# Patient Record
Sex: MALE | Race: WHITE | Employment: FULL TIME | URBAN - METROPOLITAN AREA
[De-identification: names, ages, dates, MRNs, and addresses within clinical notes are randomized per-mention and may not be internally consistent; named-entity substitution may affect disease eponyms.]

---

## 2018-05-29 ENCOUNTER — OFFICE VISIT (OUTPATIENT)
Dept: FAMILY MEDICINE CLINIC | Facility: CLINIC | Age: 43
End: 2018-05-29

## 2018-05-29 VITALS
BODY MASS INDEX: 23.37 KG/M2 | HEIGHT: 72.75 IN | TEMPERATURE: 98 F | RESPIRATION RATE: 14 BRPM | HEART RATE: 84 BPM | DIASTOLIC BLOOD PRESSURE: 108 MMHG | WEIGHT: 176.38 LBS | SYSTOLIC BLOOD PRESSURE: 152 MMHG

## 2018-05-29 DIAGNOSIS — K92.1 BLOOD IN STOOL: ICD-10-CM

## 2018-05-29 DIAGNOSIS — F17.200 TOBACCO DEPENDENCE: ICD-10-CM

## 2018-05-29 DIAGNOSIS — Z00.00 ANNUAL PHYSICAL EXAM: Primary | ICD-10-CM

## 2018-05-29 DIAGNOSIS — F10.20 UNCOMPLICATED ALCOHOL DEPENDENCE (HCC): ICD-10-CM

## 2018-05-29 DIAGNOSIS — R19.8 ABNORMAL BOWEL MOVEMENT: ICD-10-CM

## 2018-05-29 DIAGNOSIS — R03.0 ELEVATED BP WITHOUT DIAGNOSIS OF HYPERTENSION: ICD-10-CM

## 2018-05-29 PROCEDURE — 99201 OFFICE/OUTPT VISIT,NEW,LEVL I: CPT | Performed by: FAMILY MEDICINE

## 2018-05-29 PROCEDURE — 99212 OFFICE O/P EST SF 10 MIN: CPT | Performed by: FAMILY MEDICINE

## 2018-05-29 PROCEDURE — 90471 IMMUNIZATION ADMIN: CPT | Performed by: FAMILY MEDICINE

## 2018-05-29 PROCEDURE — 90715 TDAP VACCINE 7 YRS/> IM: CPT | Performed by: FAMILY MEDICINE

## 2018-05-29 PROCEDURE — 99386 PREV VISIT NEW AGE 40-64: CPT | Performed by: FAMILY MEDICINE

## 2018-05-29 NOTE — PROGRESS NOTES
HPI:    Silke Draper is a 44year old male presents to clinic for an annual physical exam.   Has not seen a doctor since he was 17years old. Notes that he has hemorrhoids, thinks they have improved over the years.  Now he has developed some anal leaka HENT:   Head: Normocephalic and atraumatic.    Right Ear: Tympanic membrane, external ear and ear canal normal.   Left Ear: Tympanic membrane, external ear and ear canal normal.   Nose: Nose normal.   Mouth/Throat: Uvula is midline, oropharynx is clear an nights. Patient is willing to try this. Inpatient programs discussed, patient not agreeable to this right now. Patient verbalized understanding of information discussed. No barriers to learning observed.              Orders This Visit:    Orders Placed

## 2018-06-15 ENCOUNTER — LAB ENCOUNTER (OUTPATIENT)
Dept: LAB | Age: 43
End: 2018-06-15
Attending: FAMILY MEDICINE
Payer: COMMERCIAL

## 2018-06-15 DIAGNOSIS — K92.1 BLOOD IN STOOL: ICD-10-CM

## 2018-06-15 DIAGNOSIS — Z00.00 ANNUAL PHYSICAL EXAM: ICD-10-CM

## 2018-06-15 PROCEDURE — 85025 COMPLETE CBC W/AUTO DIFF WBC: CPT

## 2018-06-15 PROCEDURE — 83036 HEMOGLOBIN GLYCOSYLATED A1C: CPT

## 2018-06-15 PROCEDURE — 84443 ASSAY THYROID STIM HORMONE: CPT

## 2018-06-15 PROCEDURE — 36415 COLL VENOUS BLD VENIPUNCTURE: CPT

## 2018-06-15 PROCEDURE — 80061 LIPID PANEL: CPT

## 2018-06-15 PROCEDURE — 80053 COMPREHEN METABOLIC PANEL: CPT

## 2018-06-22 ENCOUNTER — TELEPHONE (OUTPATIENT)
Dept: FAMILY MEDICINE CLINIC | Facility: CLINIC | Age: 43
End: 2018-06-22

## 2018-06-22 NOTE — TELEPHONE ENCOUNTER
Called patient to discuss results, he did not answer so a VM was left asking him to call back. When he does, please inform him of normal Cmp, TSH, and A1C. Also, lipids are mildly elevated, low fat diet and exercise advised, will recheck in 6 months.  Caterina

## 2018-06-28 ENCOUNTER — TELEPHONE (OUTPATIENT)
Dept: GASTROENTEROLOGY | Facility: CLINIC | Age: 43
End: 2018-06-28

## 2018-06-28 DIAGNOSIS — R19.4 CHANGE IN BOWEL HABITS: ICD-10-CM

## 2018-06-28 DIAGNOSIS — K62.5 RECTAL BLEEDING: Primary | ICD-10-CM

## 2018-06-28 NOTE — TELEPHONE ENCOUNTER
Scheduled for:  Colonoscopy 72479  Provider Name: Dr. Malissa Mccoy  Date:  8/15/18  Location:  [facility]  Sedation:  MAC  Time:  10:30 am, arrival 9:30 am  Prep: Suprep  Meds/Allergies Reconciled?:  Physician reviewed  Diagnosis with codes:  Rectal bleeding K62.5, change

## 2018-06-28 NOTE — PATIENT INSTRUCTIONS
1. Cut back on alcohol as we discussed  2. See Dr. Alyse Purvis to discuss alcohol use  3. Schedule colonoscopy with MAC (rectal bleeding, change in bowels)  4.  Abdominal ultrasound

## 2018-06-28 NOTE — H&P
[facility], [facility] - Gastroenterology                                                                                                               Reason for consult: r week     Comment: DAILY       Medications (Active prior to today's visit):    Current Outpatient Prescriptions:  Na Sulfate-K Sulfate-Mg Sulf (SUPREP BOWEL PREP KIT) 17.5-3.13-1.6 GM/180ML Oral Solution Take as directed Disp: 1 Bottle Rfl: 0       Allergie and then stop entirely. I also asked him to f/u with Dr. Hamilton Ready to discuss options to see an addiction specialist/psychiatry. for anxiety which he has. Will also obtain ab US. LFTs wnl.     #Rectal bleeding/change in bowels- sx could be related to IBS-D a

## 2018-06-29 PROBLEM — K62.5 RECTAL BLEEDING: Status: ACTIVE | Noted: 2018-06-29

## 2018-06-29 PROBLEM — F10.10 ALCOHOL ABUSE: Status: ACTIVE | Noted: 2018-06-29

## 2018-07-20 ENCOUNTER — TELEPHONE (OUTPATIENT)
Dept: FAMILY MEDICINE CLINIC | Facility: CLINIC | Age: 43
End: 2018-07-20

## 2018-07-20 NOTE — TELEPHONE ENCOUNTER
Pt is calling requesting to speak with a Rn state that he would like to discuss his lab result he had done in June

## 2018-07-21 NOTE — TELEPHONE ENCOUNTER
LMTCB. Please transfer to triage. From 06/22/18 telephone encounter-normal Cmp, TSH, and A1C. Also, lipids are mildly elevated, low fat diet and exercise advised, will recheck in 6 months.  Patient's WBC count is elevated

## 2018-08-15 ENCOUNTER — ANESTHESIA (OUTPATIENT)
Dept: ENDOSCOPY | Facility: HOSPITAL | Age: 43
End: 2018-08-15
Payer: COMMERCIAL

## 2018-08-15 ENCOUNTER — HOSPITAL ENCOUNTER (OUTPATIENT)
Facility: HOSPITAL | Age: 43
Setting detail: HOSPITAL OUTPATIENT SURGERY
Discharge: HOME | End: 2018-08-15
Attending: INTERNAL MEDICINE | Admitting: INTERNAL MEDICINE
Payer: COMMERCIAL

## 2018-08-15 ENCOUNTER — SURGERY (OUTPATIENT)
Age: 43
End: 2018-08-15

## 2018-08-15 ENCOUNTER — ANESTHESIA EVENT (OUTPATIENT)
Dept: ENDOSCOPY | Facility: HOSPITAL | Age: 43
End: 2018-08-15
Payer: COMMERCIAL

## 2018-08-15 VITALS
DIASTOLIC BLOOD PRESSURE: 88 MMHG | SYSTOLIC BLOOD PRESSURE: 135 MMHG | RESPIRATION RATE: 29 BRPM | HEIGHT: 74 IN | BODY MASS INDEX: 24.38 KG/M2 | HEART RATE: 69 BPM | WEIGHT: 190 LBS | TEMPERATURE: 98 F | OXYGEN SATURATION: 97 %

## 2018-08-15 DIAGNOSIS — K62.5 RECTAL BLEEDING: ICD-10-CM

## 2018-08-15 DIAGNOSIS — R19.4 CHANGE IN BOWEL HABITS: ICD-10-CM

## 2018-08-15 PROCEDURE — 45330 DIAGNOSTIC SIGMOIDOSCOPY: CPT | Performed by: INTERNAL MEDICINE

## 2018-08-15 PROCEDURE — 0DJD8ZZ INSPECTION OF LOWER INTESTINAL TRACT, VIA NATURAL OR ARTIFICIAL OPENING ENDOSCOPIC: ICD-10-PCS | Performed by: INTERNAL MEDICINE

## 2018-08-15 RX ORDER — SODIUM CHLORIDE, SODIUM LACTATE, POTASSIUM CHLORIDE, CALCIUM CHLORIDE 600; 310; 30; 20 MG/100ML; MG/100ML; MG/100ML; MG/100ML
INJECTION, SOLUTION INTRAVENOUS CONTINUOUS
Status: DISCONTINUED | OUTPATIENT
Start: 2018-08-15 | End: 2018-08-15

## 2018-08-15 RX ORDER — NALOXONE HYDROCHLORIDE 0.4 MG/ML
80 INJECTION, SOLUTION INTRAMUSCULAR; INTRAVENOUS; SUBCUTANEOUS AS NEEDED
Status: DISCONTINUED | OUTPATIENT
Start: 2018-08-15 | End: 2018-08-15

## 2018-08-15 RX ORDER — LIDOCAINE HYDROCHLORIDE 10 MG/ML
INJECTION, SOLUTION EPIDURAL; INFILTRATION; INTRACAUDAL; PERINEURAL AS NEEDED
Status: DISCONTINUED | OUTPATIENT
Start: 2018-08-15 | End: 2018-08-15 | Stop reason: SURG

## 2018-08-15 RX ADMIN — SODIUM CHLORIDE, SODIUM LACTATE, POTASSIUM CHLORIDE, CALCIUM CHLORIDE: 600; 310; 30; 20 INJECTION, SOLUTION INTRAVENOUS at 10:06:00

## 2018-08-15 RX ADMIN — LIDOCAINE HYDROCHLORIDE 50 MG: 10 INJECTION, SOLUTION EPIDURAL; INFILTRATION; INTRACAUDAL; PERINEURAL at 10:08:00

## 2018-08-15 NOTE — ANESTHESIA POSTPROCEDURE EVALUATION
Patient: Eugenia Jackson    Procedure Summary     Date:  08/15/18 Room / Location:  [address] [place] [address] ENDOSCOPY 01 / [address] [place] [address] ENDOSCOPY    Anesthesia Start:  9899 Anesthesia Stop:  6038    Procedure:  COLONOSCOPY (N/A ) Diagnosis:       Rectal bleeding      Change in юлия

## 2018-08-15 NOTE — H&P
History & Physical Examination    Patient Name: Shaunna Nguyen  MRN: [id number]  CSN: [id number]  YOB: 1975    Diagnosis: rectal bleeding, change in bowels      Prescriptions Prior to Admission:  Na Sulfate-K Sulfate-Mg Sulf (SUPREP BOWEL

## 2018-08-15 NOTE — ANESTHESIA PREPROCEDURE EVALUATION
Anesthesia PreOp Note    HPI:     Shaji Holland is a 44year old male who presents for preoperative consultation requested by: Ana Isabel MD    Date of Surgery: 8/15/2018    Procedure(s):  COLONOSCOPY  Indication: Rectal bleeding, Change in юлия MCH 35.2 (H) 06/15/2018   MCHC 34.5 06/15/2018   RDW 12.4 06/15/2018    06/15/2018   MPV 8.1 06/15/2018       Lab Results  Component Value Date    06/15/2018   K 4.1 06/15/2018    06/15/2018   CO2 24 06/15/2018   BUN 3 (L) 06/15/2018

## 2018-08-15 NOTE — OPERATIVE REPORT
COLONOSCOPY REPORT    Coreen SALCIDO 1975 Age 44year old   PCP Ziggy Abdalla MD Endoscopist Camacho Maxwell MD     Date of procedure: 08/15/18    Procedure: Colonoscopy     Pre-operative diagnosis: Rectal bleeding, change in bowels pediatric scope for an EGD scope, and despite a thinner more flexible scope, this area of sharp angulation could not be traversed. The procedure was stopped. Air was suctioned from the colon.      4. A retroflexed view of the rectum revealed small internal

## 2018-08-18 ENCOUNTER — TELEPHONE (OUTPATIENT)
Dept: GASTROENTEROLOGY | Facility: CLINIC | Age: 43
End: 2018-08-18

## 2018-08-18 DIAGNOSIS — R10.33 PERIUMBILICAL ABDOMINAL PAIN: Primary | ICD-10-CM

## 2018-08-18 DIAGNOSIS — R19.8 CHANGE IN BOWEL FUNCTION: ICD-10-CM

## 2018-08-18 DIAGNOSIS — R14.0 ABDOMINAL DISTENTION: ICD-10-CM

## 2018-08-18 NOTE — TELEPHONE ENCOUNTER
GI Clinical Staff:   1. Patient with incomplete c-scope last week due to adhesions in abdomen, please call to see how he is doing  2.  He will need CT A/P for (ab pain, change in bowels)- with IV /PO contrast, please call him to set up

## 2018-08-21 NOTE — TELEPHONE ENCOUNTER
2nd LMTCB for update on his sxs.  Pt has been scheduled for CT a/p:  Future Appointments  Date Time Provider Kevin Beaver   8/23/2018 5:00 PM [facility] CT RM1 [facility] CT SCAN EM [facility]

## 2018-08-23 ENCOUNTER — HOSPITAL ENCOUNTER (OUTPATIENT)
Dept: CT IMAGING | Facility: HOSPITAL | Age: 43
Discharge: HOME | End: 2018-08-23
Attending: INTERNAL MEDICINE
Payer: COMMERCIAL

## 2018-08-23 DIAGNOSIS — R19.8 CHANGE IN BOWEL FUNCTION: ICD-10-CM

## 2018-08-23 DIAGNOSIS — R14.0 ABDOMINAL DISTENTION: ICD-10-CM

## 2018-08-23 DIAGNOSIS — R10.33 PERIUMBILICAL ABDOMINAL PAIN: ICD-10-CM

## 2018-08-23 LAB — CREAT BLD-MCNC: 0.8 MG/DL (ref 0.5–1.5)

## 2018-08-23 PROCEDURE — 74177 CT ABD & PELVIS W/CONTRAST: CPT | Performed by: INTERNAL MEDICINE

## 2018-08-23 PROCEDURE — 82565 ASSAY OF CREATININE: CPT

## 2018-08-24 ENCOUNTER — TELEPHONE (OUTPATIENT)
Dept: GASTROENTEROLOGY | Facility: CLINIC | Age: 43
End: 2018-08-24

## 2018-08-24 DIAGNOSIS — K40.20 NON-RECURRENT BILATERAL INGUINAL HERNIA WITHOUT OBSTRUCTION OR GANGRENE: Primary | ICD-10-CM

## 2018-08-24 NOTE — TELEPHONE ENCOUNTER
Left detailed message for patient on his personal cell re: CT A/P showing large L inguinal scrotal hernia. This is the reason for the incomplete colonoscopy. The clip placed previously is also visible in the colon. No obvious malignancy seen on imaging.  John Paul Mcgovern

## 2018-08-24 NOTE — TELEPHONE ENCOUNTER
LM for pt w/ number to general surgery [phone]). Told to c/b if he has further questions or concerns. Routed to gen surgery- please assist pt w/ scheduling consultation w/ provider, thank you.

## 2018-08-28 NOTE — TELEPHONE ENCOUNTER
Future Appointments  Date Time Provider Kevin Beaver   9/6/2018 8:00 AM Nasra Saravia MD [facility]

## 2018-09-06 ENCOUNTER — OFFICE VISIT (OUTPATIENT)
Dept: SURGERY | Facility: CLINIC | Age: 43
End: 2018-09-06
Payer: COMMERCIAL

## 2018-09-06 VITALS — WEIGHT: 190 LBS | HEIGHT: 74 IN | BODY MASS INDEX: 24.38 KG/M2

## 2018-09-06 DIAGNOSIS — K40.30 INCARCERATED LEFT INGUINAL HERNIA: Primary | ICD-10-CM

## 2018-09-06 PROCEDURE — 99244 OFF/OP CNSLTJ NEW/EST MOD 40: CPT | Performed by: SURGERY

## 2018-09-06 PROCEDURE — 99212 OFFICE O/P EST SF 10 MIN: CPT | Performed by: SURGERY

## 2018-09-06 NOTE — H&P
History and Physical      Ariadne Jimenes is a 44year old male. HPI   Patient presents with:  Hernia: Patient referred by GI Dr. May Hernandez for bilateral inguinal hernias. Patient states he has hernia on left side, noticed 1 year ago.  Hernia has increa Diabetes Cousin    • Diabetes Cousin          ROS   A comprehensive 10 point review of systems was completed. Pertinent positives and negatives noted in the the HPI.     Review of Systems    PHYSICAL EXAM   Ht 6' 2\" (1.88 m)   Wt 190 lb (86.2 kg)   BMI 24 Considering OR later this year to accommodate catering season.          9/6/2018  Shahida De La Vega MD

## 2018-11-06 ENCOUNTER — HOSPITAL ENCOUNTER (OUTPATIENT)
Facility: HOSPITAL | Age: 43
Setting detail: HOSPITAL OUTPATIENT SURGERY
Discharge: HOME | End: 2018-11-06
Attending: SURGERY | Admitting: SURGERY
Payer: COMMERCIAL

## 2018-11-06 ENCOUNTER — ANESTHESIA (OUTPATIENT)
Dept: SURGERY | Facility: HOSPITAL | Age: 43
End: 2018-11-06
Payer: COMMERCIAL

## 2018-11-06 ENCOUNTER — ANESTHESIA EVENT (OUTPATIENT)
Dept: SURGERY | Facility: HOSPITAL | Age: 43
End: 2018-11-06
Payer: COMMERCIAL

## 2018-11-06 VITALS
DIASTOLIC BLOOD PRESSURE: 93 MMHG | SYSTOLIC BLOOD PRESSURE: 130 MMHG | HEART RATE: 78 BPM | RESPIRATION RATE: 14 BRPM | TEMPERATURE: 98 F | BODY MASS INDEX: 23.78 KG/M2 | OXYGEN SATURATION: 95 % | WEIGHT: 185.31 LBS | HEIGHT: 74 IN

## 2018-11-06 DIAGNOSIS — K40.30 INCARCERATED LEFT INGUINAL HERNIA: ICD-10-CM

## 2018-11-06 PROCEDURE — 0YU60JZ SUPPLEMENT LEFT INGUINAL REGION WITH SYNTHETIC SUBSTITUTE, OPEN APPROACH: ICD-10-PCS | Performed by: SURGERY

## 2018-11-06 PROCEDURE — 0VB5XZZ EXCISION OF SCROTUM, EXTERNAL APPROACH: ICD-10-PCS | Performed by: SURGERY

## 2018-11-06 PROCEDURE — 0HQAXZZ REPAIR INGUINAL SKIN, EXTERNAL APPROACH: ICD-10-PCS | Performed by: SURGERY

## 2018-11-06 PROCEDURE — 49507 PRP I/HERN INIT BLOCK >5 YR: CPT | Performed by: SURGERY

## 2018-11-06 PROCEDURE — 12041 INTMD RPR N-HF/GENIT 2.5CM/<: CPT | Performed by: SURGERY

## 2018-11-06 PROCEDURE — 11422 EXC H-F-NK-SP B9+MARG 1.1-2: CPT | Performed by: SURGERY

## 2018-11-06 DEVICE — POLYPROPLYLENE NONABSORBABLE SYNTHETIC SURGICAL MESH
Type: IMPLANTABLE DEVICE | Site: GROIN | Status: FUNCTIONAL
Brand: PROLENE

## 2018-11-06 RX ORDER — MORPHINE SULFATE 4 MG/ML
4 INJECTION, SOLUTION INTRAMUSCULAR; INTRAVENOUS EVERY 10 MIN PRN
Status: DISCONTINUED | OUTPATIENT
Start: 2018-11-06 | End: 2018-11-06

## 2018-11-06 RX ORDER — BUPIVACAINE HYDROCHLORIDE AND EPINEPHRINE 5; 5 MG/ML; UG/ML
INJECTION, SOLUTION PERINEURAL AS NEEDED
Status: DISCONTINUED | OUTPATIENT
Start: 2018-11-06 | End: 2018-11-06 | Stop reason: HOSPADM

## 2018-11-06 RX ORDER — METOCLOPRAMIDE 10 MG/1
10 TABLET ORAL ONCE
Status: DISCONTINUED | OUTPATIENT
Start: 2018-11-06 | End: 2018-11-06 | Stop reason: HOSPADM

## 2018-11-06 RX ORDER — GLYCOPYRROLATE 0.2 MG/ML
INJECTION INTRAMUSCULAR; INTRAVENOUS AS NEEDED
Status: DISCONTINUED | OUTPATIENT
Start: 2018-11-06 | End: 2018-11-06 | Stop reason: SURG

## 2018-11-06 RX ORDER — SODIUM CHLORIDE, SODIUM LACTATE, POTASSIUM CHLORIDE, CALCIUM CHLORIDE 600; 310; 30; 20 MG/100ML; MG/100ML; MG/100ML; MG/100ML
INJECTION, SOLUTION INTRAVENOUS CONTINUOUS
Status: DISCONTINUED | OUTPATIENT
Start: 2018-11-06 | End: 2018-11-06

## 2018-11-06 RX ORDER — MORPHINE SULFATE 10 MG/ML
6 INJECTION, SOLUTION INTRAMUSCULAR; INTRAVENOUS EVERY 10 MIN PRN
Status: DISCONTINUED | OUTPATIENT
Start: 2018-11-06 | End: 2018-11-06

## 2018-11-06 RX ORDER — HYDROCODONE BITARTRATE AND ACETAMINOPHEN 5; 325 MG/1; MG/1
2 TABLET ORAL AS NEEDED
Status: DISCONTINUED | OUTPATIENT
Start: 2018-11-06 | End: 2018-11-06

## 2018-11-06 RX ORDER — NALOXONE HYDROCHLORIDE 0.4 MG/ML
80 INJECTION, SOLUTION INTRAMUSCULAR; INTRAVENOUS; SUBCUTANEOUS AS NEEDED
Status: DISCONTINUED | OUTPATIENT
Start: 2018-11-06 | End: 2018-11-06

## 2018-11-06 RX ORDER — MORPHINE SULFATE 4 MG/ML
2 INJECTION, SOLUTION INTRAMUSCULAR; INTRAVENOUS EVERY 10 MIN PRN
Status: DISCONTINUED | OUTPATIENT
Start: 2018-11-06 | End: 2018-11-06

## 2018-11-06 RX ORDER — ACETAMINOPHEN 500 MG
1000 TABLET ORAL ONCE
Status: COMPLETED | OUTPATIENT
Start: 2018-11-06 | End: 2018-11-06

## 2018-11-06 RX ORDER — LIDOCAINE HYDROCHLORIDE 40 MG/ML
SOLUTION TOPICAL AS NEEDED
Status: DISCONTINUED | OUTPATIENT
Start: 2018-11-06 | End: 2018-11-06 | Stop reason: SURG

## 2018-11-06 RX ORDER — CEFAZOLIN SODIUM/WATER 2 G/20 ML
2 SYRINGE (ML) INTRAVENOUS ONCE
Status: DISCONTINUED | OUTPATIENT
Start: 2018-11-06 | End: 2018-11-06 | Stop reason: HOSPADM

## 2018-11-06 RX ORDER — HYDROCODONE BITARTRATE AND ACETAMINOPHEN 5; 325 MG/1; MG/1
1 TABLET ORAL AS NEEDED
Status: DISCONTINUED | OUTPATIENT
Start: 2018-11-06 | End: 2018-11-06

## 2018-11-06 RX ORDER — LIDOCAINE HYDROCHLORIDE 10 MG/ML
INJECTION, SOLUTION EPIDURAL; INFILTRATION; INTRACAUDAL; PERINEURAL AS NEEDED
Status: DISCONTINUED | OUTPATIENT
Start: 2018-11-06 | End: 2018-11-06 | Stop reason: SURG

## 2018-11-06 RX ORDER — ONDANSETRON 2 MG/ML
4 INJECTION INTRAMUSCULAR; INTRAVENOUS ONCE AS NEEDED
Status: DISCONTINUED | OUTPATIENT
Start: 2018-11-06 | End: 2018-11-06

## 2018-11-06 RX ORDER — ROCURONIUM BROMIDE 10 MG/ML
INJECTION, SOLUTION INTRAVENOUS AS NEEDED
Status: DISCONTINUED | OUTPATIENT
Start: 2018-11-06 | End: 2018-11-06 | Stop reason: SURG

## 2018-11-06 RX ORDER — ONDANSETRON 2 MG/ML
INJECTION INTRAMUSCULAR; INTRAVENOUS AS NEEDED
Status: DISCONTINUED | OUTPATIENT
Start: 2018-11-06 | End: 2018-11-06 | Stop reason: SURG

## 2018-11-06 RX ORDER — NEOSTIGMINE METHYLSULFATE 0.5 MG/ML
INJECTION INTRAVENOUS AS NEEDED
Status: DISCONTINUED | OUTPATIENT
Start: 2018-11-06 | End: 2018-11-06 | Stop reason: SURG

## 2018-11-06 RX ORDER — FAMOTIDINE 20 MG/1
20 TABLET ORAL ONCE
Status: DISCONTINUED | OUTPATIENT
Start: 2018-11-06 | End: 2018-11-06 | Stop reason: HOSPADM

## 2018-11-06 RX ORDER — HYDROCODONE BITARTRATE AND ACETAMINOPHEN 5; 325 MG/1; MG/1
1 TABLET ORAL EVERY 4 HOURS PRN
Qty: 20 TABLET | Refills: 0 | Status: SHIPPED | OUTPATIENT
Start: 2018-11-06 | End: 2018-12-13 | Stop reason: ALTCHOICE

## 2018-11-06 RX ORDER — DEXAMETHASONE SODIUM PHOSPHATE 4 MG/ML
VIAL (ML) INJECTION AS NEEDED
Status: DISCONTINUED | OUTPATIENT
Start: 2018-11-06 | End: 2018-11-06 | Stop reason: SURG

## 2018-11-06 RX ORDER — MIDAZOLAM HYDROCHLORIDE 1 MG/ML
INJECTION INTRAMUSCULAR; INTRAVENOUS AS NEEDED
Status: DISCONTINUED | OUTPATIENT
Start: 2018-11-06 | End: 2018-11-06 | Stop reason: SURG

## 2018-11-06 RX ADMIN — GLYCOPYRROLATE 0.6 MG: 0.2 INJECTION INTRAMUSCULAR; INTRAVENOUS at 08:53:00

## 2018-11-06 RX ADMIN — MIDAZOLAM HYDROCHLORIDE 4 MG: 1 INJECTION INTRAMUSCULAR; INTRAVENOUS at 07:39:00

## 2018-11-06 RX ADMIN — LIDOCAINE HYDROCHLORIDE 100 MG: 10 INJECTION, SOLUTION EPIDURAL; INFILTRATION; INTRACAUDAL; PERINEURAL at 07:41:00

## 2018-11-06 RX ADMIN — SODIUM CHLORIDE, SODIUM LACTATE, POTASSIUM CHLORIDE, CALCIUM CHLORIDE: 600; 310; 30; 20 INJECTION, SOLUTION INTRAVENOUS at 07:34:00

## 2018-11-06 RX ADMIN — DEXAMETHASONE SODIUM PHOSPHATE 8 MG: 4 MG/ML VIAL (ML) INJECTION at 07:52:00

## 2018-11-06 RX ADMIN — ONDANSETRON 4 MG: 2 INJECTION INTRAMUSCULAR; INTRAVENOUS at 07:52:00

## 2018-11-06 RX ADMIN — ROCURONIUM BROMIDE 50 MG: 10 INJECTION, SOLUTION INTRAVENOUS at 07:42:00

## 2018-11-06 RX ADMIN — LIDOCAINE HYDROCHLORIDE 2 ML: 40 SOLUTION TOPICAL at 07:43:00

## 2018-11-06 RX ADMIN — NEOSTIGMINE METHYLSULFATE 3 MG: 0.5 INJECTION INTRAVENOUS at 08:53:00

## 2018-11-06 RX ADMIN — SODIUM CHLORIDE, SODIUM LACTATE, POTASSIUM CHLORIDE, CALCIUM CHLORIDE: 600; 310; 30; 20 INJECTION, SOLUTION INTRAVENOUS at 08:56:00

## 2018-11-06 NOTE — H&P
History and Physical        Verle Meals is a 44year old male.        HPI   Patient presents with:  Hernia: Patient referred by GI Dr. Isabella Felty for bilateral inguinal hernias. Patient states he has hernia on left side, noticed 1 year ago.  Hernia has in Problem Relation Age of Onset   • Diabetes Brother     • Diabetes Cousin     • Diabetes Cousin              ROS   A comprehensive 10 point review of systems was completed.   Pertinent positives and negatives noted in the the HPI.     Review of Systems    provided. Plan L ing hernia repair with mesh - they understand risks.   Considering OR later this year to accommodate catering season.  Kip Muhammad MD

## 2018-11-06 NOTE — ANESTHESIA PROCEDURE NOTES
ANESTHESIA INTUBATION  Urgency: elective      General Information and Staff    Patient location during procedure: OR  Anesthesiologist: Sanjiv Manjarrez MD  Resident/CRNA: Sher Michelle CRNA  Performed: CRNA     Indications and Patient Condition  Indicati

## 2018-11-06 NOTE — ANESTHESIA POSTPROCEDURE EVALUATION
Patient: Mayi Lorenzo    Procedure Summary     Date:  11/06/18 Room / Location:  [facility] OR  / [facility] OR    Anesthesia Start:  1821 Anesthesia Stop:  5585    Procedure:   HERNIA INGUINAL REPAIR ADULT (Left ) Diagnosis:       Incarcerated left ing

## 2018-11-06 NOTE — ANESTHESIA PREPROCEDURE EVALUATION
Anesthesia PreOp Note    HPI:     Emelina Sweeney is a 44year old male who presents for preoperative consultation requested by: Boone Grossman MD    Date of Surgery: 11/6/2018    Procedure(s):   HERNIA INGUINAL REPAIR ADULT  Indication: Incarcerated lef • Diabetes Cousin    • Diabetes Cousin    • Hypertension Father    • Hypertension Mother      Social History    Socioeconomic History      Marital status:       Spouse name: Not on file      Number of children: 0      Years of education: Not on file (+) implants        Pulmonary    (+) sleep apnea (undiagnosed but wife states he does and she has to rouse him to breathe at night), wheezes (right upper lobe inspiratory),     ROS comment: Smokes 1ppd for 27 years, smoked this AM    Smokes Marijuana 2-3x/

## 2018-11-06 NOTE — BRIEF OP NOTE
Pre-Operative Diagnosis: Incarcerated left inguinal hernia [K40.30]     Post-Operative Diagnosis: Incarcerated left inguinal hernia [K40.30]      Procedure Performed:   Procedure(s):  Repair of left inguinal hernia with mesh, excision of left groin skin ta

## 2018-11-06 NOTE — INTERVAL H&P NOTE
Pre-op Diagnosis: Incarcerated left inguinal hernia [K40.30]    The above referenced H&P was reviewed by Crystal Hale MD on 11/6/2018, the patient was examined and no significant changes have occurred in the patient's condition since the H&P was performed

## 2018-11-06 NOTE — OPERATIVE REPORT
[facility]    PATIENT'S NAME: Maylin Brasher   ATTENDING PHYSICIAN: Gabby Holland MD   OPERATING PHYSICIAN: Gabby Holland MD   PATIENT ACCOUNT#:   [id number]    LOCATION:  [facility] PACU [address] [address]  MEDICAL RECORD #:   [id number]       DA subcutaneous tissue in the groin and eventually in the scrotum were infiltrated with 0.5% Marcaine with epinephrine. A skin incision was made in the left inguinal area and extended down into the subcutaneous tissue using electrocautery for hemostasis.   Ex but still within the scrotum. This was similar to its position preoperatively. The patient was extubated and taken to the recovery room in stable condition.     Dictated By Nissa Mcghee MD  d: 11/06/2018 09:29:15  t: 11/06/2018 09:55:33  [facility] [id number]

## 2018-11-12 ENCOUNTER — TELEPHONE (OUTPATIENT)
Dept: SURGERY | Facility: CLINIC | Age: 43
End: 2018-11-12

## 2018-11-12 NOTE — TELEPHONE ENCOUNTER
Contacted patient. S/P LIH repair with mesh and excision of left scrotal skin lesion (1.6 cm diameter) with layered closure 2.0 cm length on 11/06/2018. Patient asking for norco refill. Tylenol is not helping.  He feels like the lesion incision is as if \"s

## 2018-12-12 ENCOUNTER — NURSE TRIAGE (OUTPATIENT)
Dept: OTHER | Age: 43
End: 2018-12-12

## 2018-12-12 NOTE — TELEPHONE ENCOUNTER
Action Requested: Summary for Provider     []  Critical Lab, Recommendations Needed  [] Need Additional Advice  []   FYI    []   Need Orders  [] Need Medications Sent to Pharmacy  []  Other     SUMMARY: appt made with Dr. Terrie Curry 12/13/18.      Reason for ca

## 2018-12-13 ENCOUNTER — OFFICE VISIT (OUTPATIENT)
Dept: FAMILY MEDICINE CLINIC | Facility: CLINIC | Age: 43
End: 2018-12-13
Payer: COMMERCIAL

## 2018-12-13 VITALS
WEIGHT: 185 LBS | SYSTOLIC BLOOD PRESSURE: 151 MMHG | TEMPERATURE: 98 F | BODY MASS INDEX: 24 KG/M2 | DIASTOLIC BLOOD PRESSURE: 104 MMHG | HEART RATE: 71 BPM | RESPIRATION RATE: 16 BRPM

## 2018-12-13 DIAGNOSIS — I10 ESSENTIAL HYPERTENSION: Primary | ICD-10-CM

## 2018-12-13 DIAGNOSIS — F10.20 UNCOMPLICATED ALCOHOL DEPENDENCE (HCC): ICD-10-CM

## 2018-12-13 PROCEDURE — 99214 OFFICE O/P EST MOD 30 MIN: CPT | Performed by: FAMILY MEDICINE

## 2018-12-13 PROCEDURE — 99212 OFFICE O/P EST SF 10 MIN: CPT | Performed by: FAMILY MEDICINE

## 2018-12-13 RX ORDER — HYDROCHLOROTHIAZIDE 25 MG/1
25 TABLET ORAL DAILY
Qty: 90 TABLET | Refills: 1 | Status: SHIPPED | OUTPATIENT
Start: 2018-12-13 | End: 2019-06-03

## 2018-12-13 NOTE — PROGRESS NOTES
HPI: Khalida Benítez is a 44year old male who presents for HTN. Saw Dr. Leonora Gonzales in May for physical and was diagnosed. Denies chest pain, SOB, palpitations, edema, headaches, or vision changes. States he does not have a healthy diet.  Both parents have high bl

## 2019-01-25 NOTE — TELEPHONE ENCOUNTER
Pt. States that he just had surgery and needs a refill for his pain med. Hydrocodone. Current Outpatient Medications:  HYDROcodone-acetaminophen 5-325 MG Oral Tab Take 1 tablet by mouth every 4 (four) hours as needed.  Disp: 20 tablet Rfl: 0 Statement Selected

## 2019-03-04 ENCOUNTER — APPOINTMENT (OUTPATIENT)
Dept: LAB | Facility: HOSPITAL | Age: 44
End: 2019-03-04
Attending: FAMILY MEDICINE
Payer: COMMERCIAL

## 2019-03-04 ENCOUNTER — OFFICE VISIT (OUTPATIENT)
Dept: FAMILY MEDICINE CLINIC | Facility: CLINIC | Age: 44
End: 2019-03-04
Payer: COMMERCIAL

## 2019-03-04 VITALS
TEMPERATURE: 98 F | SYSTOLIC BLOOD PRESSURE: 143 MMHG | BODY MASS INDEX: 23.12 KG/M2 | WEIGHT: 180.19 LBS | HEIGHT: 74 IN | HEART RATE: 99 BPM | DIASTOLIC BLOOD PRESSURE: 99 MMHG

## 2019-03-04 DIAGNOSIS — E78.5 HYPERLIPIDEMIA, UNSPECIFIED HYPERLIPIDEMIA TYPE: ICD-10-CM

## 2019-03-04 DIAGNOSIS — I10 ESSENTIAL HYPERTENSION: Primary | ICD-10-CM

## 2019-03-04 PROCEDURE — 36415 COLL VENOUS BLD VENIPUNCTURE: CPT

## 2019-03-04 PROCEDURE — 99214 OFFICE O/P EST MOD 30 MIN: CPT | Performed by: FAMILY MEDICINE

## 2019-03-04 PROCEDURE — 99212 OFFICE O/P EST SF 10 MIN: CPT | Performed by: FAMILY MEDICINE

## 2019-03-04 RX ORDER — LISINOPRIL 10 MG/1
10 TABLET ORAL DAILY
Qty: 90 TABLET | Refills: 0 | Status: SHIPPED | OUTPATIENT
Start: 2019-03-04 | End: 2019-06-04

## 2019-03-04 NOTE — PROGRESS NOTES
HPI:    Ruddy Clay is a 44year old male presents to clinic for follow-up regarding hypertension. Patient notes compliance with medication, denies side effects.   Checks his blood pressure at home 1-2 times a week, ranges from 140s-150s over 95-1 Systems  See HPI  PHYSICAL EXAM:      03/04/19  1337   BP: (!) 143/99   Pulse: 99   Temp: 97.7 °F (36.5 °C)   TempSrc: Oral   Weight: 180 lb 3.2 oz (81.7 kg)   Height: 6' 2\" (1.88 m)     Physical Exam   Constitutional: No distress.    HENT:   Head: Normoce

## 2019-03-05 LAB
CHOL/HDLC RATIO: 2.7 (CALC)
CHOLESTEROL, TOTAL: 222 MG/DL
HDL CHOLESTEROL: 82 MG/DL
LDL-CHOLESTEROL: 113 MG/DL (CALC)
NON-HDL CHOLESTEROL: 140 MG/DL (CALC)
TRIGLYCERIDES: 154 MG/DL

## 2019-06-03 RX ORDER — HYDROCHLOROTHIAZIDE 25 MG/1
TABLET ORAL
Qty: 90 TABLET | Refills: 1 | Status: SHIPPED | OUTPATIENT
Start: 2019-06-03 | End: 2020-04-30

## 2019-06-04 RX ORDER — LISINOPRIL 10 MG/1
TABLET ORAL
Qty: 90 TABLET | Refills: 0 | Status: SHIPPED | OUTPATIENT
Start: 2019-06-04 | End: 2019-09-01

## 2019-09-02 RX ORDER — LISINOPRIL 10 MG/1
TABLET ORAL
Qty: 90 TABLET | Refills: 0 | Status: SHIPPED | OUTPATIENT
Start: 2019-09-02 | End: 2019-12-09

## 2019-09-02 NOTE — TELEPHONE ENCOUNTER
Please review; protocol failed.     Requested Prescriptions     Pending Prescriptions Disp Refills   • LISINOPRIL 10 MG Oral Tab [Pharmacy Med Name: LISINOPRIL 10 MG TABLET] 90 tablet 0     Sig: TAKE 1 TABLET BY MOUTH EVERY DAY         Recent Visits  Date T

## 2019-09-02 NOTE — TELEPHONE ENCOUNTER
Rx approved, please contact patient to schedule annual physical with fasting labs with Dr. Jayshree Cutler

## 2019-12-09 RX ORDER — LISINOPRIL 10 MG/1
TABLET ORAL
Qty: 90 TABLET | Refills: 0 | Status: SHIPPED | OUTPATIENT
Start: 2019-12-09 | End: 2020-03-26

## 2019-12-09 NOTE — TELEPHONE ENCOUNTER
Requested Prescriptions     Pending Prescriptions Disp Refills   • LISINOPRIL 10 MG Oral Tab [Pharmacy Med Name: LISINOPRIL 10 MG TABLET] 90 tablet 0     Sig: TAKE 1 TABLET BY MOUTH EVERY DAY         Recent Visits  Date Type Provider Dept   03/04/19 Office

## 2019-12-18 NOTE — TELEPHONE ENCOUNTER
Called, busy signal.    Please reply to pool: EM CALL CENTER--please schedule patient to see Dr Albert Ricks

## 2020-01-30 NOTE — TELEPHONE ENCOUNTER
Tried calling patient's number-busy signal (this will be the second call for today )  Left message to patient's spouse to call back. No MyChart. RN=please try to call one more time tomorrow and then send a letter-NO PHONE RESPONSE if needed.

## 2020-01-30 NOTE — TELEPHONE ENCOUNTER
2nd attempt - Unable to reach patient, phone rang and then was picked up but no answer or voicemail.

## 2020-01-31 RX ORDER — HYDROCHLOROTHIAZIDE 25 MG/1
TABLET ORAL
Qty: 90 TABLET | Refills: 1 | OUTPATIENT
Start: 2020-01-31

## 2020-01-31 NOTE — TELEPHONE ENCOUNTER
Called patient's # on file--rings, then goes to busy signal, also left message for Ana [facility]) for patient to return call. 3rd attempt to reach patient--no response letter sent    Dr. Janine Weiner you want Rx sent or deny?

## 2020-03-26 RX ORDER — LISINOPRIL 10 MG/1
TABLET ORAL
Qty: 30 TABLET | Refills: 0 | Status: SHIPPED | OUTPATIENT
Start: 2020-03-26 | End: 2020-04-30

## 2020-04-29 NOTE — TELEPHONE ENCOUNTER
Called patient and set him up for a telephone visit for tomorrow 4/30/2020 @ 9:15. Patient verbalized understanding.

## 2020-04-30 ENCOUNTER — VIRTUAL PHONE E/M (OUTPATIENT)
Dept: FAMILY MEDICINE CLINIC | Facility: CLINIC | Age: 45
End: 2020-04-30
Payer: COMMERCIAL

## 2020-04-30 DIAGNOSIS — F10.20 UNCOMPLICATED ALCOHOL DEPENDENCE (HCC): ICD-10-CM

## 2020-04-30 DIAGNOSIS — F17.200 TOBACCO DEPENDENCE: ICD-10-CM

## 2020-04-30 DIAGNOSIS — I10 ESSENTIAL HYPERTENSION: Primary | ICD-10-CM

## 2020-04-30 PROCEDURE — 99442 PHONE E/M BY PHYS 11-20 MIN: CPT | Performed by: FAMILY MEDICINE

## 2020-04-30 RX ORDER — LISINOPRIL 10 MG/1
TABLET ORAL
Qty: 30 TABLET | Refills: 0 | OUTPATIENT
Start: 2020-04-30

## 2020-04-30 RX ORDER — HYDROCHLOROTHIAZIDE 25 MG/1
25 TABLET ORAL DAILY
Qty: 90 TABLET | Refills: 1 | Status: SHIPPED | OUTPATIENT
Start: 2020-04-30 | End: 2020-12-04

## 2020-04-30 RX ORDER — LISINOPRIL 10 MG/1
10 TABLET ORAL DAILY
Qty: 90 TABLET | Refills: 1 | Status: SHIPPED | OUTPATIENT
Start: 2020-04-30 | End: 2020-12-04

## 2020-04-30 NOTE — PROGRESS NOTES
Virtual Telephone Check-In    Emelina Sweeney verbally consents to a Virtual/Telephone Check-In visit on 04/30/20. Patient understands and accepts financial responsibility for any deductible, co-insurance and/or co-pays associated with this service. completed using two-way, real-time interactive audio and/or video communication.   This has been done in good patricia to provide continuity of care in the best interest of the provider-patient relationship, due to the ongoing public health crisis/national kyree

## 2020-07-02 ENCOUNTER — TELEPHONE (OUTPATIENT)
Dept: FAMILY MEDICINE CLINIC | Facility: CLINIC | Age: 45
End: 2020-07-02

## 2020-07-02 NOTE — TELEPHONE ENCOUNTER
Talked to the patient and instructed to reach out to his insurance company to check. I asked if he would like to make an appointment and he stated not at this time.

## 2020-12-03 NOTE — TELEPHONE ENCOUNTER
hydrochlorothiazide 25 MG Oral Tab    lisinopril 10 MG Oral Tab    Patient calling for a refill on medication out of medicine     Patient is  Asking if this can be put on a 90 day supply so he don't have to go out much.       Please advise   [phone]

## 2020-12-04 RX ORDER — LISINOPRIL 10 MG/1
10 TABLET ORAL DAILY
Qty: 90 TABLET | Refills: 1 | Status: SHIPPED | OUTPATIENT
Start: 2020-12-04 | End: 2021-05-29

## 2020-12-04 RX ORDER — HYDROCHLOROTHIAZIDE 25 MG/1
25 TABLET ORAL DAILY
Qty: 90 TABLET | Refills: 1 | Status: SHIPPED | OUTPATIENT
Start: 2020-12-04 | End: 2021-05-29

## 2021-05-29 RX ORDER — HYDROCHLOROTHIAZIDE 25 MG/1
TABLET ORAL
Qty: 90 TABLET | Refills: 0 | Status: SHIPPED | OUTPATIENT
Start: 2021-05-29 | End: 2021-09-23

## 2021-05-29 RX ORDER — LISINOPRIL 10 MG/1
TABLET ORAL
Qty: 90 TABLET | Refills: 0 | Status: SHIPPED | OUTPATIENT
Start: 2021-05-29 | End: 2021-09-22

## 2021-09-22 RX ORDER — LISINOPRIL 10 MG/1
10 TABLET ORAL DAILY
Qty: 30 TABLET | Refills: 0 | OUTPATIENT
Start: 2021-09-22 | End: 2021-09-30

## 2021-09-22 NOTE — TELEPHONE ENCOUNTER
CSS, please assist patient with scheduling a physical appointment with Dr. Caterina Rutledge. Please review: Protocol failed/No protocol    Requested Prescriptions   Pending Prescriptions Disp Refills    lisinopril 10 MG Oral Tab 90 tablet 0     Sig: Take 1 tablet (10 mg total) by mouth daily.         Hypertensive Medications Protocol Failed - 9/22/2021  9:48 AM        Failed - CMP or BMP in past 12 months        Failed - Appointment in past 6 or next 3 months        Passed - GFR Non- > 50     Lab Results   Component Value Date    GFRNAA >60 08/23/2018                       Recent Outpatient Visits              1 year ago Essential hypertension    [facility], [facility], [place], Carrie Dearth, MD    [facility]    2 years ago Essential hypertension    [facility], Kenji Iqbal MD    Office Visit    2 years ago Essential hypertension    [facility], Miguel [place], [place]    Office Visit    3 years ago Incarcerated left inguinal hernia    [facility] Kevin Sahni MD    Office Visit    3 years ago Alcohol abuse    [facility], [address] [place] [address] MD Francisco J    Office Visit

## 2021-09-23 NOTE — TELEPHONE ENCOUNTER
Please review. Protocol failed / No protocol. Requested Prescriptions   Pending Prescriptions Disp Refills    hydroCHLOROthiazide 25 MG Oral Tab 90 tablet 0     Sig: Take 1 tablet (25 mg total) by mouth daily.         Hypertensive Medications Protocol Failed - 9/23/2021 11:21 AM        Failed - CMP or BMP in past 12 months        Failed - Appointment in past 6 or next 3 months        Passed - GFR Non- > 50     Lab Results   Component Value Date    GFRNAA >60 08/23/2018                         Recent Outpatient Visits              1 year ago Essential hypertension    [facility] [address], [place], Dulcy Lope, MD    [facility]    2 years ago Essential hypertension    [facility], [address], Jack Domínguez MD    Office Visit    2 years ago Essential hypertension    [facility], [facility], [address], [place], [place]    Office Visit    3 years ago Incarcerated left inguinal hernia    [facility] Jon Cherry MD    Office Visit    3 years ago Alcohol abuse    [facility], [facility], [address], [address] [place] [address] MD Francisco J    Office Visit

## 2021-09-24 RX ORDER — HYDROCHLOROTHIAZIDE 25 MG/1
25 TABLET ORAL DAILY
Qty: 30 TABLET | Refills: 0 | Status: SHIPPED | OUTPATIENT
Start: 2021-09-24

## 2021-09-29 RX ORDER — HYDROCHLOROTHIAZIDE 25 MG/1
TABLET ORAL
Qty: 30 TABLET | Refills: 0 | OUTPATIENT
Start: 2021-09-29

## 2021-09-30 RX ORDER — LISINOPRIL 10 MG/1
10 TABLET ORAL DAILY
Qty: 90 TABLET | Refills: 0 | Status: SHIPPED | OUTPATIENT
Start: 2021-09-30

## 2021-09-30 NOTE — TELEPHONE ENCOUNTER
Pt is calling for status of his lisinopril  medication refill request. Per the patient he is out of medication. Pt did make an appt for his physical with Dr. Caterina Rutledge on 11-5-21 this was the first available. Pt can be reached at [phone].

## 2021-11-05 ENCOUNTER — TELEPHONE (OUTPATIENT)
Dept: FAMILY MEDICINE CLINIC | Facility: CLINIC | Age: 46
End: 2021-11-05

## 2021-11-05 NOTE — TELEPHONE ENCOUNTER
Email sent to Shaina Mejia to inquire about either dismissal for noncompliance or warning letter for consecutive No Show appointments.

## 2021-11-08 RX ORDER — HYDROCHLOROTHIAZIDE 25 MG/1
TABLET ORAL
Qty: 30 TABLET | Refills: 0 | OUTPATIENT
Start: 2021-11-08

## 2022-03-25 NOTE — TELEPHONE ENCOUNTER
Sent email to [employer] To find out if letter was sent and if there was a form letter regarding compliance.

## 2022-03-25 NOTE — TELEPHONE ENCOUNTER
Email sent to Pelon Pichardo on 11/8/21 and she recommended a letter be sent to patient about excessive No Shows and Cancellations and being Non-Compliant. Sent another email to [employer] on 3/25/22 to f/u on letter status.

## 2023-12-22 ENCOUNTER — OFFICE VISIT (OUTPATIENT)
Dept: FAMILY MEDICINE CLINIC | Facility: CLINIC | Age: 48
End: 2023-12-22

## 2023-12-22 ENCOUNTER — LAB ENCOUNTER (OUTPATIENT)
Dept: LAB | Age: 48
End: 2023-12-22
Attending: FAMILY MEDICINE
Payer: COMMERCIAL

## 2023-12-22 VITALS
OXYGEN SATURATION: 98 % | SYSTOLIC BLOOD PRESSURE: 147 MMHG | WEIGHT: 190 LBS | RESPIRATION RATE: 16 BRPM | HEART RATE: 71 BPM | BODY MASS INDEX: 24 KG/M2 | DIASTOLIC BLOOD PRESSURE: 101 MMHG

## 2023-12-22 DIAGNOSIS — F17.200 TOBACCO DEPENDENCE: ICD-10-CM

## 2023-12-22 DIAGNOSIS — I10 ESSENTIAL HYPERTENSION: ICD-10-CM

## 2023-12-22 DIAGNOSIS — F10.20 UNCOMPLICATED ALCOHOL DEPENDENCE (HCC): ICD-10-CM

## 2023-12-22 DIAGNOSIS — Z00.00 ANNUAL PHYSICAL EXAM: Primary | ICD-10-CM

## 2023-12-22 PROCEDURE — 99406 BEHAV CHNG SMOKING 3-10 MIN: CPT | Performed by: FAMILY MEDICINE

## 2023-12-22 PROCEDURE — 90632 HEPA VACCINE ADULT IM: CPT | Performed by: FAMILY MEDICINE

## 2023-12-22 PROCEDURE — 90746 HEPB VACCINE 3 DOSE ADULT IM: CPT | Performed by: FAMILY MEDICINE

## 2023-12-22 PROCEDURE — 3077F SYST BP >= 140 MM HG: CPT | Performed by: FAMILY MEDICINE

## 2023-12-22 PROCEDURE — 99396 PREV VISIT EST AGE 40-64: CPT | Performed by: FAMILY MEDICINE

## 2023-12-22 PROCEDURE — 3080F DIAST BP >= 90 MM HG: CPT | Performed by: FAMILY MEDICINE

## 2023-12-22 PROCEDURE — 90686 IIV4 VACC NO PRSV 0.5 ML IM: CPT | Performed by: FAMILY MEDICINE

## 2023-12-22 PROCEDURE — 90472 IMMUNIZATION ADMIN EACH ADD: CPT | Performed by: FAMILY MEDICINE

## 2023-12-22 PROCEDURE — 90471 IMMUNIZATION ADMIN: CPT | Performed by: FAMILY MEDICINE

## 2023-12-22 PROCEDURE — 90677 PCV20 VACCINE IM: CPT | Performed by: FAMILY MEDICINE

## 2023-12-22 RX ORDER — LISINOPRIL 10 MG/1
10 TABLET ORAL DAILY
Qty: 90 TABLET | Refills: 0 | Status: SHIPPED | OUTPATIENT
Start: 2023-12-22

## 2023-12-22 RX ORDER — HYDROCHLOROTHIAZIDE 25 MG/1
25 TABLET ORAL DAILY
Qty: 90 TABLET | Refills: 0 | Status: SHIPPED | OUTPATIENT
Start: 2023-12-22

## 2023-12-22 NOTE — PATIENT INSTRUCTIONS
Quitting Smoking    Quitting smoking is the most important step you can take to improve your health. We're glad you have set a goal to improve your health. Quit Smoking Resources    In addition to medications, use the STAR plan to help you successfully quit. Stick with your quit date! Tell friends, family, and coworkers your quit date. Request their understanding and support. Anticipate and prepare for challenges. Some examples are withdrawal symptoms, being around others who smoke, and drinking alcohol. Remove all tobacco products and paraphernalia from your environment. Make your home and vehicles smoke-free. Free resources for additional support:  National tobacco quitline: 1-800-QUIT-NOW (1-558.668.2320). SmokefreeTXT is a free text program to assist you in quitting. Visit http://AssetAvenue/ for more information. Feel free to call your care manager at ([phone]) for additional support.

## 2023-12-23 LAB
ALBUMIN/GLOBULIN RATIO: 1.5 (CALC) (ref 1–2.5)
ALBUMIN: 4.1 G/DL (ref 3.6–5.1)
ALKALINE PHOSPHATASE: 92 U/L (ref 36–130)
ALT: 38 U/L (ref 9–46)
AST: 40 U/L (ref 10–40)
BILIRUBIN, TOTAL: 1.5 MG/DL (ref 0.2–1.2)
BUN/CREATININE RATIO: 7 (CALC) (ref 6–22)
BUN: 5 MG/DL (ref 7–25)
CALCIUM: 9.1 MG/DL (ref 8.6–10.3)
CARBON DIOXIDE: 24 MMOL/L (ref 20–32)
CHLORIDE: 103 MMOL/L (ref 98–110)
CHOL/HDLC RATIO: 2.3 (CALC)
CHOLESTEROL, TOTAL: 207 MG/DL
CREATININE: 0.75 MG/DL (ref 0.6–1.29)
EGFR: 112 ML/MIN/1.73M2
GLOBULIN: 2.8 G/DL (CALC) (ref 1.9–3.7)
GLUCOSE: 101 MG/DL (ref 65–99)
HDL CHOLESTEROL: 91 MG/DL
LDL-CHOLESTEROL: 100 MG/DL (CALC)
NON-HDL CHOLESTEROL: 116 MG/DL (CALC)
POTASSIUM: 3.8 MMOL/L (ref 3.5–5.3)
PROTEIN, TOTAL: 6.9 G/DL (ref 6.1–8.1)
SODIUM: 138 MMOL/L (ref 135–146)
T4, FREE: 0.9 NG/DL (ref 0.8–1.8)
TRIGLYCERIDES: 72 MG/DL
TSH W/REFLEX TO FT4: 5.25 MIU/L (ref 0.4–4.5)

## 2024-02-02 ENCOUNTER — TELEPHONE (OUTPATIENT)
Dept: FAMILY MEDICINE CLINIC | Facility: CLINIC | Age: 49
End: 2024-02-02

## 2024-02-02 NOTE — TELEPHONE ENCOUNTER
Patient seen at [facility] ED (Care Everywhere)  Asking if Folic Acid and Thiamine are over the counter  Confirmed he can purchase both over the counter  Patient verbalized understanding    Future Appointments   Date Time Provider Department Center   2/9/2024  2:30 PM Zandra Guy APRN [facility]       Carmelo Mendes, KIM - 01/31/2024 10:25 AM CST   Formatting of this note might be different from the original.  -  your vitamins over the counter

## 2024-02-09 ENCOUNTER — OFFICE VISIT (OUTPATIENT)
Dept: FAMILY MEDICINE CLINIC | Facility: CLINIC | Age: 49
End: 2024-02-09

## 2024-02-09 VITALS
DIASTOLIC BLOOD PRESSURE: 84 MMHG | HEIGHT: 74 IN | RESPIRATION RATE: 17 BRPM | WEIGHT: 184.38 LBS | BODY MASS INDEX: 23.66 KG/M2 | SYSTOLIC BLOOD PRESSURE: 138 MMHG | HEART RATE: 116 BPM

## 2024-02-09 DIAGNOSIS — E87.1 HYPONATREMIA: ICD-10-CM

## 2024-02-09 DIAGNOSIS — F17.200 TOBACCO DEPENDENCE: ICD-10-CM

## 2024-02-09 DIAGNOSIS — I10 ESSENTIAL HYPERTENSION: Primary | ICD-10-CM

## 2024-02-09 DIAGNOSIS — H00.011 HORDEOLUM EXTERNUM OF RIGHT UPPER EYELID: ICD-10-CM

## 2024-02-09 DIAGNOSIS — F10.20 UNCOMPLICATED ALCOHOL DEPENDENCE (HCC): ICD-10-CM

## 2024-02-09 DIAGNOSIS — Z48.02 VISIT FOR SUTURE REMOVAL: ICD-10-CM

## 2024-02-09 RX ORDER — LISINOPRIL 20 MG/1
20 TABLET ORAL DAILY
Qty: 90 TABLET | Refills: 1 | Status: SHIPPED | OUTPATIENT
Start: 2024-02-09

## 2024-02-09 RX ORDER — NICOTINE 21 MG/24HR
1 PATCH, TRANSDERMAL 24 HOURS TRANSDERMAL EVERY 24 HOURS
Qty: 7 EACH | Refills: 0 | Status: SHIPPED | OUTPATIENT
Start: 2024-02-09

## 2024-02-09 RX ORDER — MELATONIN
100 DAILY
COMMUNITY
Start: 2024-02-01

## 2024-02-09 RX ORDER — FOLIC ACID 1 MG/1
1 TABLET ORAL DAILY
COMMUNITY
Start: 2024-02-01
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
## 2024-02-09 NOTE — PROGRESS NOTES
Tobacco Cessation Documentation (Smoking and Smokeless included):  I had an in depth therapy session with Hamzah Lakhani about his tobacco use risks and options using the USPSTF's Five A's approach:    Ask: Hamzah is using tobacco products.  Assess: We asked about/assessed behavioral health risk and factors affecting choice of behavior change goals/methods.  Specifically I asked about readiness to quit tobacco.  Advise: We gave clear, specific, and personalized behavior change advice, including information about personal health harms and benefits. Specifically, he was told that Quitting tobacco is one of the best things he can do for his health. I strongly encouraged him to quit.      Agree: We collaboratively selected appropriate treatment goals and methods based on the patient’s interest in and willingness to change the behavior.   Assist: We used behavior change techniques (self-help and/or counseling), to aid Hamzah in achieving agreed-upon goals by acquiring the skills, confidence, and social/environmental supports for behavior change, supplemented with adjunctive medical treatments when appropriate. Additionally, national quitting tobacco aides were discussed.   Arrange: Follow up at next visit- see specific follow up below.    Time Counseled: 5 minutes

## 2024-02-09 NOTE — PATIENT INSTRUCTIONS
Quitting Smoking    Quitting smoking is the most important step you can take to improve your health. We're glad you have set a goal to improve your health.    Quit Smoking Resources    In addition to medications, use the STAR plan to help you successfully quit.   Stick with your quit date!   Tell friends, family, and coworkers your quit date. Request their understanding and support.  Anticipate and prepare for challenges. Some examples are withdrawal symptoms, being around others who smoke, and drinking alcohol.  Remove all tobacco products and paraphernalia from your environment. Make your home and vehicles smoke-free.    Free resources for additional support:  National tobacco quitline: 1-800-QUIT-NOW (1-253.935.9570).  SmokefreeTXT is a free text program to assist you in quitting. Visit https://www.smokefree.gov/smokefreetxt for more information.  Feel free to call your care manager at ([phone]) for additional support.

## 2024-10-10 RX ORDER — LISINOPRIL 20 MG/1
20 TABLET ORAL DAILY
Qty: 90 TABLET | Refills: 3 | Status: SHIPPED | OUTPATIENT
Start: 2024-10-10

## 2024-10-10 NOTE — TELEPHONE ENCOUNTER
Refill Per Protocol     Requested Prescriptions   Pending Prescriptions Disp Refills    LISINOPRIL 20 MG Oral Tab [Pharmacy Med Name: LISINOPRIL 20 MG TABLET] 90 tablet 1     Sig: TAKE 1 TABLET BY MOUTH EVERY DAY       Hypertension Medications Protocol Passed - 10/10/2024  2:36 PM        Passed - CMP or BMP in past 12 months        Passed - Last BP reading less than 140/90     BP Readings from Last 1 Encounters:   02/09/24 138/84               Passed - In person appointment or virtual visit in the past 12 mos or appointment in next 3 mos     Recent Outpatient Visits              8 months ago Essential hypertension    [facility] Paresh Harmon MD    Office Visit    9 months ago Annual physical exam    [facility] Paresh Harmon MD    Office Visit    4 years ago Essential hypertension    [facility] Paresh Harmon MD    Virtual Phone E/M    5 years ago Essential hypertension    [facility] Paresh Harmon MD    Office Visit    5 years ago Essential hypertension    [facility] Weiler, Colleen M, DO    Office Visit                      Passed - EGFRCR or GFRNAA > 50     GFR Evaluation  EGFRCR: 112 , resulted on 12/22/2023                   Recent Outpatient Visits              8 months ago Essential hypertension    [facility] Paresh Land MD    Office Visit    9 months ago Annual physical exam    [facility] Paresh Land MD    Office Visit    4 years ago Essential hypertension    [facility] Paresh Land MD    Virtual Phone E/M    5 years ago Essential hypertension    [facility] Paresh Land MD    Office Visit    5 years ago Essential  hypertension    [facility] Weiler, Colleen M, DO    Office Visit

## 2024-10-16 ENCOUNTER — NURSE TRIAGE (OUTPATIENT)
Dept: FAMILY MEDICINE CLINIC | Facility: CLINIC | Age: 49
End: 2024-10-16

## 2024-10-16 NOTE — TELEPHONE ENCOUNTER
Coughing, stuffy nose, headache, toothache on top, right ear clogged, maybe fever yesterday but not today.

## 2024-10-16 NOTE — TELEPHONE ENCOUNTER
Action Requested: Summary for Provider     []  Critical Lab, Recommendations Needed  [] Need Additional Advice  []   FYI    []   Need Orders  [] Need Medications Sent to Pharmacy  []  Other     SUMMARY: Patient reports cough, stuffy nose, headache, upper toothache, sensation of clogged right ear started 10/14/2024 at 6 pm.   Thought he had fever, which has subsided.  He is wondering if he should test for Covid?  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  Denies chest pain, shortness of breath, or wheezing.  His mother will go to pharmacy to  Covid test, he is wondering if anything else he should do?  Advised:  Take home Covid test, call back with result.  If positive, consider televisit to discuss antiviral medication or he can go to Immediate Care.  Await his callback for further instructions.    Reason for call: Acute Cough, nasal congestion  Onset: 10/14/2024 at 6 pm.    Spoke with patient,  verified.   No audible cough or wheeze noted.    Reason for Disposition   Cough with cold symptoms (e.g., runny nose, postnasal drip, throat clearing)    Protocols used: Cough-A-OH

## (undated) DEVICE — DRAIN INCS 3/8IN 12IN PNRS RBR

## (undated) DEVICE — Device: Brand: DEFENDO AIR/WATER/SUCTION AND BIOPSY VALVE

## (undated) DEVICE — STERILE LATEX POWDER-FREE SURGICAL GLOVESWITH NITRILE COATING: Brand: PROTEXIS

## (undated) DEVICE — UNDYED BRAIDED (POLYGLACTIN 910), SYNTHETIC ABSORBABLE SUTURE: Brand: COATED VICRYL

## (undated) DEVICE — SPONGE: SPECIALTY PEANUT XR 100/CS: Brand: MEDICAL ACTION INDUSTRIES

## (undated) DEVICE — DERMABOND LIQUID ADHESIVE

## (undated) DEVICE — CAUTERY BLADE 2IN INS E1455

## (undated) DEVICE — VIOLET BRAIDED (POLYGLACTIN 910), SYNTHETIC ABSORBABLE SUTURE: Brand: COATED VICRYL

## (undated) DEVICE — SOL  .9 1000ML BTL

## (undated) DEVICE — CLIP LGT 11MM OPEN 2.8MM 235CM

## (undated) DEVICE — DRAPE SHEET TRANSVERSE LAP

## (undated) DEVICE — ENDOSCOPY PACK - LOWER: Brand: MEDLINE INDUSTRIES, INC.

## (undated) DEVICE — MINOR GENERAL: Brand: MEDLINE INDUSTRIES, INC.

## (undated) NOTE — LETTER
Gertrudis Gutierrez, [address] [facility] [address] [facility]       09/06/18        Patient: Gemini Choi   YOB: 1975   Date of Visit: 9/6/2018       Dear  Dr. Denzel Chun MD,      Thank you for referring Gemini Choi to

## (undated) NOTE — LETTER
[address] [place] [address] Rd, [place], [place]     AUTHORIZATION FOR SURGICAL OPERATION OR PROCEDURE    I hereby authorize Dr. Linnette Arevalo MD, my Physician(s) and whomever may be designated as the doctor's Assistant, to perform the follo 4. I consent to the photographing of procedure(s) to be performed for the purposes of advancing medicine, science and/or education, provided my identity is not revealed.  If the procedure has been videotaped, the physician/surgeon will obtain the original v (Witness signature)                                                                                                  (Date)                                (Time)  STATEMENT OF PHYSICIAN My signature below affirms that prior to the time of the procedure;  I

## (undated) NOTE — LETTER
7/2/2018              Mary Colmenares [address] [place]         Dear You Recio,    This letter is to inform you that our office has made several attempts to reach you by phone without success.   We were attempting to con